# Patient Record
Sex: MALE | Race: ASIAN | NOT HISPANIC OR LATINO | URBAN - METROPOLITAN AREA
[De-identification: names, ages, dates, MRNs, and addresses within clinical notes are randomized per-mention and may not be internally consistent; named-entity substitution may affect disease eponyms.]

---

## 2018-06-18 ENCOUNTER — EMERGENCY (EMERGENCY)
Facility: HOSPITAL | Age: 38
LOS: 1 days | Discharge: HOME | End: 2018-06-18
Attending: EMERGENCY MEDICINE | Admitting: EMERGENCY MEDICINE
Payer: SELF-PAY

## 2018-06-18 VITALS
DIASTOLIC BLOOD PRESSURE: 100 MMHG | OXYGEN SATURATION: 100 % | HEART RATE: 53 BPM | TEMPERATURE: 99 F | RESPIRATION RATE: 16 BRPM | SYSTOLIC BLOOD PRESSURE: 135 MMHG

## 2018-06-18 LAB
BASOPHILS # BLD AUTO: 0.06 K/UL — SIGNIFICANT CHANGE UP (ref 0–0.2)
BASOPHILS NFR BLD AUTO: 0.7 % — SIGNIFICANT CHANGE UP (ref 0–2)
EOSINOPHIL # BLD AUTO: 0.42 K/UL — SIGNIFICANT CHANGE UP (ref 0–0.5)
EOSINOPHIL NFR BLD AUTO: 4.6 % — SIGNIFICANT CHANGE UP (ref 0–6)
HCT VFR BLD CALC: 47 % — SIGNIFICANT CHANGE UP (ref 39–50)
HGB BLD-MCNC: 15.5 G/DL — SIGNIFICANT CHANGE UP (ref 13–17)
IMM GRANULOCYTES # BLD AUTO: 0.03 # — SIGNIFICANT CHANGE UP
IMM GRANULOCYTES NFR BLD AUTO: 0.3 % — SIGNIFICANT CHANGE UP (ref 0–1.5)
LYMPHOCYTES # BLD AUTO: 3.11 K/UL — SIGNIFICANT CHANGE UP (ref 1–3.3)
LYMPHOCYTES # BLD AUTO: 34.1 % — SIGNIFICANT CHANGE UP (ref 13–44)
MCHC RBC-ENTMCNC: 28.5 PG — SIGNIFICANT CHANGE UP (ref 27–34)
MCHC RBC-ENTMCNC: 33 % — SIGNIFICANT CHANGE UP (ref 32–36)
MCV RBC AUTO: 86.4 FL — SIGNIFICANT CHANGE UP (ref 80–100)
MONOCYTES # BLD AUTO: 0.58 K/UL — SIGNIFICANT CHANGE UP (ref 0–0.9)
MONOCYTES NFR BLD AUTO: 6.4 % — SIGNIFICANT CHANGE UP (ref 2–14)
NEUTROPHILS # BLD AUTO: 4.93 K/UL — SIGNIFICANT CHANGE UP (ref 1.8–7.4)
NEUTROPHILS NFR BLD AUTO: 53.9 % — SIGNIFICANT CHANGE UP (ref 43–77)
NRBC # FLD: 0 — SIGNIFICANT CHANGE UP
PLATELET # BLD AUTO: 345 K/UL — SIGNIFICANT CHANGE UP (ref 150–400)
PMV BLD: 9.3 FL — SIGNIFICANT CHANGE UP (ref 7–13)
RBC # BLD: 5.44 M/UL — SIGNIFICANT CHANGE UP (ref 4.2–5.8)
RBC # FLD: 12.3 % — SIGNIFICANT CHANGE UP (ref 10.3–14.5)
WBC # BLD: 9.13 K/UL — SIGNIFICANT CHANGE UP (ref 3.8–10.5)
WBC # FLD AUTO: 9.13 K/UL — SIGNIFICANT CHANGE UP (ref 3.8–10.5)

## 2018-06-18 PROCEDURE — 99285 EMERGENCY DEPT VISIT HI MDM: CPT | Mod: 25

## 2018-06-18 PROCEDURE — 93010 ELECTROCARDIOGRAM REPORT: CPT

## 2018-06-18 NOTE — ED PROVIDER NOTE - PLAN OF CARE
Follow up with a primary care doctor within 48-72 hours. Follow up with Neurology within the week.  You can call: [facility] Find a Physician helpline ([phone]) for assistance. Take copies of your reports given to you.  Absolutely no driving, no operating heavy machinery. No drinking alcohol.  Worsening, continued or ANY new concerning symptoms return to the emergency department.

## 2018-06-18 NOTE — ED ADULT TRIAGE NOTE - CHIEF COMPLAINT QUOTE
C/o syncopal episode Saturday after taking one shot of alcohol. Pt states he was feeling dizzy prior to episode. States brother caught him so he did not fall/hit head. Denies chest pain or dizziness at present. Denies medical hx.

## 2018-06-18 NOTE — ED PROVIDER NOTE - OBJECTIVE STATEMENT
38 y/o M no pmh presents s/p syncopal episode 2 days ago. States he had drank heavily the night before (vodka), woke up and felt fine. Went about his day and went back out with his brother. States that he took a shot of vodka, had a cigarette and then got lightheaded and passed out. He fell into his brother's arms and his body "shaked". No head trauma, no injuries.  His brother then threw cold water over him and he woke up. States currently he feels fine. Denies chest pain, shortness of breath, weakness, numbness, tingling, nausea, vomiting, headache, dizziness. States one prior episode 1 year ago.  Appears very well. States he has not seen a doctor in 20 years.

## 2018-06-18 NOTE — ED PROVIDER NOTE - MEDICAL DECISION MAKING DETAILS
38 y/o m no pmh presenting s/p syncopal episode 2 nights ago after taking a shot of alcohol and having a cigarette with "shaking activity"- EKG, basic labs, TSH, Neuro outpt follow up 38 y/o m no pmh presenting s/p syncopal episode 2 nights ago after taking a shot of alcohol and having a cigarette with "shaking activity"- EKG, basic labs, TSH, Ct head, Neuro consult 36 y/o m no pmh presenting s/p syncopal episode 2 nights ago after taking a shot of alcohol and having a cigarette with "shaking activity"- EKG, basic labs, TSH, Ct head, Neuro outpt follow up

## 2018-06-18 NOTE — ED PROVIDER NOTE - ATTENDING CONTRIBUTION TO CARE
Dr. Palma: DR. CHAPMAN, ATTENDING MD-  I performed a face to face bedside interview with patient regarding history of present illness, review of symptoms and past medical history. I completed an independent physical exam.  I have discussed patient's plan of care with PA.   Documentation as above in the note.    Louie: h/o EtOH abuse brought in after having an episode of passing out shaking 2 days ago.  Patient reports trying to cut back on his drinking last week and days ago was drinking when he suddenly felt severely dizzy and passed out.  No palpitations/CP/dyspnea prior to event.  Patient reports that he was told by bystanders that he was "shaking" and "muscles clenched up".  No bowel/bladder incontinence.  No lip lacerations.  No recent head trauma.  Last drink was yesterday.  EKG unremarkable, no ischemic changes, normal QTc, no findings suggestive of brugada or HCM.  On exam: pleasant, comfortable, steady gait, atremulous, fluent speech, cn 2-12 intact, 5/5 strength throughout, normal sensation to light touch, normal FTN.  A/P: seizure 2 days ago, likely secondary to withdrawal.  Not currently manifesting signs or sx suggestive of withdrawal.  Will check head CT and basic labs and refer to neurology clinic for further outpatient workup.  Advising abstinence from EtOh, no driving or operating heavy machinery until neuro followup

## 2018-06-18 NOTE — ED PROVIDER NOTE - CARE PLAN
Principal Discharge DX:	Syncope and collapse Principal Discharge DX:	Syncope and collapse  Assessment and plan of treatment:	Follow up with a primary care doctor within 48-72 hours. Follow up with Neurology within the week.  You can call: [facility] Find a Physician helpline ([phone]) for assistance. Take copies of your reports given to you.  Absolutely no driving, no operating heavy machinery. No drinking alcohol.  Worsening, continued or ANY new concerning symptoms return to the emergency department. Principal Discharge DX:	Seizure  Assessment and plan of treatment:	Follow up with a primary care doctor within 48-72 hours. Follow up with Neurology within the week.  You can call: [facility] Find a Physician helpline ([phone]) for assistance. Take copies of your reports given to you.  Absolutely no driving, no operating heavy machinery. No drinking alcohol.  Worsening, continued or ANY new concerning symptoms return to the emergency department.

## 2018-06-19 LAB
ALBUMIN SERPL ELPH-MCNC: 4.2 G/DL — SIGNIFICANT CHANGE UP (ref 3.3–5)
ALP SERPL-CCNC: 71 U/L — SIGNIFICANT CHANGE UP (ref 40–120)
ALT FLD-CCNC: 11 U/L — SIGNIFICANT CHANGE UP (ref 4–41)
AST SERPL-CCNC: 15 U/L — SIGNIFICANT CHANGE UP (ref 4–40)
BILIRUB SERPL-MCNC: 0.4 MG/DL — SIGNIFICANT CHANGE UP (ref 0.2–1.2)
BUN SERPL-MCNC: 13 MG/DL — SIGNIFICANT CHANGE UP (ref 7–23)
CALCIUM SERPL-MCNC: 9.5 MG/DL — SIGNIFICANT CHANGE UP (ref 8.4–10.5)
CHLORIDE SERPL-SCNC: 102 MMOL/L — SIGNIFICANT CHANGE UP (ref 98–107)
CO2 SERPL-SCNC: 25 MMOL/L — SIGNIFICANT CHANGE UP (ref 22–31)
CREAT SERPL-MCNC: 1.06 MG/DL — SIGNIFICANT CHANGE UP (ref 0.5–1.3)
GLUCOSE SERPL-MCNC: 88 MG/DL — SIGNIFICANT CHANGE UP (ref 70–99)
POTASSIUM SERPL-MCNC: 4.1 MMOL/L — SIGNIFICANT CHANGE UP (ref 3.5–5.3)
POTASSIUM SERPL-SCNC: 4.1 MMOL/L — SIGNIFICANT CHANGE UP (ref 3.5–5.3)
PROT SERPL-MCNC: 6.8 G/DL — SIGNIFICANT CHANGE UP (ref 6–8.3)
SODIUM SERPL-SCNC: 140 MMOL/L — SIGNIFICANT CHANGE UP (ref 135–145)
TSH SERPL-MCNC: 1.6 UIU/ML — SIGNIFICANT CHANGE UP (ref 0.27–4.2)

## 2018-06-19 PROCEDURE — 70450 CT HEAD/BRAIN W/O DYE: CPT | Mod: 26

## 2021-08-07 NOTE — ED PROVIDER NOTE - ENMT, MLM
Airway patent, Nasal mucosa clear. Mouth with normal mucosa. Throat has no vesicles, no oropharyngeal exudates and uvula is midline. no fever and no chills.